# Patient Record
Sex: FEMALE | Race: WHITE | NOT HISPANIC OR LATINO | Employment: UNEMPLOYED | URBAN - NONMETROPOLITAN AREA
[De-identification: names, ages, dates, MRNs, and addresses within clinical notes are randomized per-mention and may not be internally consistent; named-entity substitution may affect disease eponyms.]

---

## 2021-08-23 ENCOUNTER — HOSPITAL ENCOUNTER (EMERGENCY)
Facility: HOSPITAL | Age: 66
Discharge: HOME | End: 2021-08-23
Attending: EMERGENCY MEDICINE | Admitting: EMERGENCY MEDICINE

## 2021-08-23 VITALS
TEMPERATURE: 98.3 F | SYSTOLIC BLOOD PRESSURE: 144 MMHG | HEART RATE: 89 BPM | HEIGHT: 64 IN | WEIGHT: 143.2 LBS | BODY MASS INDEX: 24.45 KG/M2 | OXYGEN SATURATION: 94 % | RESPIRATION RATE: 16 BRPM | DIASTOLIC BLOOD PRESSURE: 82 MMHG

## 2021-08-23 DIAGNOSIS — T63.441A BEE STING, ACCIDENTAL OR UNINTENTIONAL, INITIAL ENCOUNTER: Primary | ICD-10-CM

## 2021-08-23 PROCEDURE — 99283 EMERGENCY DEPT VISIT LOW MDM: CPT

## 2021-08-23 RX ORDER — EPINEPHRINE 0.3 MG/.3ML
0.3 INJECTION SUBCUTANEOUS ONCE
Qty: 1 EACH | Refills: 0 | Status: SHIPPED | OUTPATIENT
Start: 2021-08-23 | End: 2021-08-23

## 2021-08-23 RX ORDER — FAMOTIDINE 20 MG/1
40 TABLET, FILM COATED ORAL
Status: DISCONTINUED | OUTPATIENT
Start: 2021-08-23 | End: 2021-08-23 | Stop reason: HOSPADM

## 2021-08-23 RX ORDER — PREDNISONE 20 MG/1
40 TABLET ORAL DAILY
Qty: 10 TABLET | Refills: 0 | Status: SHIPPED | OUTPATIENT
Start: 2021-08-23 | End: 2021-08-28

## 2021-08-23 RX ADMIN — FAMOTIDINE 40 MG: 20 TABLET ORAL at 17:43

## 2021-08-23 NOTE — ED PROVIDER NOTES
"Subjective   Chief Complaint: Bee sting/allergic reaction  History of Present Illness: 65-year-old female comes in for evaluation above complaint.  Earlier this afternoon she was stung by a bee on her left hand fourth finger.  She went to urgent treatment and received a shot of Decadron, a shot of Benadryl and a shot of epinephrine intramuscularly at around 4:30 PM.  She was told to come promptly to the emergency department to be monitored.  The patient states he did have a rash diffusely itching diffusely and a lump in her throat with a sensation of indigestion however all the symptoms have resolved at this time she is feeling much better other than some localized discomfort and soft tissue swelling to her left hand.  Onset: Few hours prior to arrival  Timing: Single inciting injury with improving symptoms  Exacerbating / Alleviating factors: The medication she was administered at urgent treatment have significantly improved her symptoms  Associated symptoms: None      Nurses Notes reviewed and agree, including vitals, allergies, social history and prior medical history.          Review of Systems   Constitutional: Negative.    HENT: Negative.         \"Lump in my throat\" that has resolved   Eyes: Negative.    Respiratory: Negative.  Negative for shortness of breath.    Cardiovascular: Negative.  Negative for chest pain.   Gastrointestinal: Negative.    Genitourinary: Negative.    Musculoskeletal: Negative.    Skin: Negative.         Diffuse rash and pruritus that has resolved   Neurological: Negative.    Psychiatric/Behavioral: Negative.        Past Medical History:   Diagnosis Date   • Anemia        No Known Allergies    History reviewed. No pertinent surgical history.    History reviewed. No pertinent family history.    Social History     Socioeconomic History   • Marital status:      Spouse name: Not on file   • Number of children: Not on file   • Years of education: Not on file   • Highest education " level: Not on file   Tobacco Use   • Smoking status: Former Smoker     Quit date: 1985     Years since quittin.0   • Smokeless tobacco: Never Used   Substance and Sexual Activity   • Alcohol use: Yes     Alcohol/week: 21.0 standard drinks     Types: 21 Cans of beer per week   • Drug use: Never   • Sexual activity: Defer           Objective   Physical Exam  Vitals and nursing note reviewed.   Constitutional:       General: She is not in acute distress.     Appearance: Normal appearance. She is normal weight. She is not ill-appearing, toxic-appearing or diaphoretic.   HENT:      Head: Normocephalic and atraumatic.      Nose: Nose normal.      Mouth/Throat:      Mouth: Mucous membranes are moist.      Pharynx: Oropharynx is clear. No oropharyngeal exudate or posterior oropharyngeal erythema.   Eyes:      Extraocular Movements: Extraocular movements intact.   Cardiovascular:      Rate and Rhythm: Normal rate and regular rhythm.   Pulmonary:      Effort: Pulmonary effort is normal. No respiratory distress.      Breath sounds: Normal breath sounds. No stridor. No wheezing.   Abdominal:      Palpations: Abdomen is soft.   Musculoskeletal:         General: Normal range of motion.      Cervical back: Normal range of motion.   Skin:     General: Skin is warm and dry.      Comments: There is some soft tissue swelling to the dorsum of the left hand and the left fourth finger   Neurological:      General: No focal deficit present.      Mental Status: She is alert.   Psychiatric:         Mood and Affect: Mood normal.         Behavior: Behavior normal.         Procedures           ED Course      1913  Some soft tissue swelling and itching to the left hand however no systemic symptoms.  No lump in her throat, no shortness of breath, no tongue or lip swelling.  Discussed with Dr. Anne, will discharge home in stable condition with strict return to care precautions.                                     MDM    Final  diagnoses:   Bee sting, accidental or unintentional, initial encounter       ED Disposition  ED Disposition     ED Disposition Condition Comment    Discharge Stable           No follow-up provider specified.       Medication List      New Prescriptions    EPINEPHrine 0.3 MG/0.3ML solution auto-injector injection  Commonly known as: EPIPEN  Inject 0.3 mL under the skin into the appropriate area as directed 1 (One) Time for 1 dose.     predniSONE 20 MG tablet  Commonly known as: DELTASONE  Take 2 tablets by mouth Daily for 5 days.           Where to Get Your Medications      Information about where to get these medications is not yet available    Ask your nurse or doctor about these medications  · EPINEPHrine 0.3 MG/0.3ML solution auto-injector injection  · predniSONE 20 MG tablet          Moises Galaviz PA-C  08/23/21 1036